# Patient Record
(demographics unavailable — no encounter records)

---

## 2025-06-26 NOTE — DISCUSSION/SUMMARY
[FreeTextEntry1] : 56 yo P1 w/s/sx of PMB in setting of suspected adenomyosis. Pt w/long h/o AUB/PMB s/p multiple benign EMBs, and ongoing thickened EMS, refractory to medical intervention.  Pt describes significant effects to her QOL. Not interested in conservative attempts at intervention. Med hx of htn, hyperlipidemia, MAYA, BMI 52, pulmonary hypertension(?)  Pt very motivated for [facility]. R/b/a d/w pt.  Plan Pt to have recent EMB sent Schedule [facility] Med clearance - kade pulmonary htn diagnosis.  Letter to Dr. Noemi Reyes

## 2025-06-26 NOTE — PHYSICAL EXAM
[Labia Majora] : normal [Normal] : normal [FreeTextEntry2] : Krystina [FreeTextEntry7] : Abd c/w BMI [FreeTextEntry4] : Small introitus [FreeTextEntry6] : Unable to palpate secondary to habitus.